# Patient Record
(demographics unavailable — no encounter records)

---

## 2025-02-26 NOTE — HISTORY OF PRESENT ILLNESS
[Regular Cycle Intervals] : periods have been regular [Currently Active] : currently active [Men] : men [Vaginal] : vaginal [No] : No [Y] : Positive pregnancy history